# Patient Record
Sex: FEMALE | URBAN - NONMETROPOLITAN AREA
[De-identification: names, ages, dates, MRNs, and addresses within clinical notes are randomized per-mention and may not be internally consistent; named-entity substitution may affect disease eponyms.]

---

## 2023-08-20 ENCOUNTER — HOSPITAL ENCOUNTER (EMERGENCY)
Facility: HOSPITAL | Age: 23
Discharge: LEFT BEFORE DISCHARGE | End: 2023-08-20

## 2023-08-20 VITALS
HEART RATE: 105 BPM | RESPIRATION RATE: 18 BRPM | SYSTOLIC BLOOD PRESSURE: 136 MMHG | BODY MASS INDEX: 29.99 KG/M2 | HEIGHT: 65 IN | TEMPERATURE: 97.8 F | DIASTOLIC BLOOD PRESSURE: 87 MMHG | OXYGEN SATURATION: 100 % | WEIGHT: 180 LBS

## 2023-08-20 PROCEDURE — 99211 OFF/OP EST MAY X REQ PHY/QHP: CPT

## 2024-02-09 ENCOUNTER — HOSPITAL ENCOUNTER (OUTPATIENT)
Dept: GENERAL RADIOLOGY | Facility: HOSPITAL | Age: 24
Discharge: HOME | End: 2024-02-09
Admitting: NURSE PRACTITIONER
Payer: COMMERCIAL

## 2024-02-09 ENCOUNTER — TRANSCRIBE ORDERS (OUTPATIENT)
Dept: ADMINISTRATIVE | Facility: HOSPITAL | Age: 24
End: 2024-02-09
Payer: COMMERCIAL

## 2024-02-09 DIAGNOSIS — M79.672 PAIN IN LEFT FOOT: Primary | ICD-10-CM

## 2024-02-09 DIAGNOSIS — M79.672 PAIN IN LEFT FOOT: ICD-10-CM

## 2024-02-09 PROCEDURE — 73630 X-RAY EXAM OF FOOT: CPT

## 2024-02-22 ENCOUNTER — OFFICE VISIT (OUTPATIENT)
Dept: INTERNAL MEDICINE | Facility: CLINIC | Age: 24
End: 2024-02-22
Payer: COMMERCIAL

## 2024-02-22 VITALS
TEMPERATURE: 97.9 F | HEIGHT: 65 IN | WEIGHT: 191 LBS | OXYGEN SATURATION: 98 % | BODY MASS INDEX: 31.82 KG/M2 | DIASTOLIC BLOOD PRESSURE: 68 MMHG | SYSTOLIC BLOOD PRESSURE: 106 MMHG | HEART RATE: 79 BPM

## 2024-02-22 DIAGNOSIS — F41.9 ANXIETY AND DEPRESSION: ICD-10-CM

## 2024-02-22 DIAGNOSIS — N92.6 IRREGULAR MENSES: ICD-10-CM

## 2024-02-22 DIAGNOSIS — F32.A ANXIETY AND DEPRESSION: ICD-10-CM

## 2024-02-22 DIAGNOSIS — F19.11 HISTORY OF SUBSTANCE ABUSE: Primary | ICD-10-CM

## 2024-02-22 PROCEDURE — 99203 OFFICE O/P NEW LOW 30 MIN: CPT | Performed by: NURSE PRACTITIONER

## 2024-02-22 RX ORDER — HYDROXYZINE HYDROCHLORIDE 10 MG/1
10 TABLET, FILM COATED ORAL 3 TIMES DAILY PRN
COMMUNITY

## 2024-02-22 RX ORDER — FLUOXETINE 10 MG/1
10 CAPSULE ORAL DAILY
COMMUNITY

## 2024-02-22 RX ORDER — HYDROXYZINE HYDROCHLORIDE 25 MG/1
25 TABLET, FILM COATED ORAL DAILY
COMMUNITY

## 2024-02-22 NOTE — PROGRESS NOTES
Subjective     Chief Complaint:  Anxiety and depression    HPI:  Patient presents to the office today for a physical exam as part of the intake process for [facility].  She has a history of methamphetamine abuse and has been to rehab for this in the past as well.  She has a medical history significant for anxiety and depression.  She has been seeing a therapist/psychiatrist at the facility and was placed on Prozac and hydroxyzine yesterday, which she has not started yet.  Please see assessment and plan below.    Patient's PMR from outside medical facility reviewed and noted.    Past Medical History:   Past Medical History:   Diagnosis Date    ADD (attention deficit disorder)     Anxiety     Depression      Past Surgical History:  Past Surgical History:   Procedure Laterality Date    TONSILLECTOMY AND ADENOIDECTOMY       Social History:  reports that she has been smoking cigarettes. She started smoking about 7 years ago. She has been smoking an average of .5 packs per day. She does not have any smokeless tobacco history on file. She reports that she does not currently use alcohol. She reports that she does not currently use drugs after having used the following drugs: Methamphetamines.    Family History: family history is not on file.      Allergies:  Allergies   Allergen Reactions    Menthol (Topical Analgesic) Swelling     Medications:  Prior to Admission medications    Medication Sig Start Date End Date Taking? Authorizing Provider   FLUoxetine (PROzac) 10 MG capsule Take 1 capsule by mouth Daily.    Dionna Kapadia MD   hydrOXYzine (ATARAX) 10 MG tablet Take 1 tablet by mouth 3 (Three) Times a Day As Needed for Itching.    Dionna Kapadia MD   hydrOXYzine (ATARAX) 25 MG tablet Take 1 tablet by mouth Daily.    Dionna Kapadia MD       Objective     Vital Signs: /68 (BP Location: Left arm, Patient Position: Sitting, Cuff Size: Adult)   Pulse 79   Temp  "97.9 °F (36.6 °C) (Temporal)   Ht 165.1 cm (65\")   Wt 86.6 kg (191 lb)   LMP 02/07/2024   SpO2 98%   BMI 31.78 kg/m²   Physical Exam  Vitals and nursing note reviewed.   Constitutional:       General: She is not in acute distress.     Appearance: She is obese. She is not ill-appearing or toxic-appearing.   HENT:      Head: Normocephalic and atraumatic.      Mouth/Throat:      Mouth: Mucous membranes are moist.      Pharynx: Oropharynx is clear.   Cardiovascular:      Rate and Rhythm: Normal rate and regular rhythm.      Pulses: Normal pulses.      Heart sounds: Normal heart sounds.   Pulmonary:      Effort: Pulmonary effort is normal.      Breath sounds: No wheezing, rhonchi or rales.   Abdominal:      General: Bowel sounds are normal. There is no distension.      Palpations: Abdomen is soft.      Tenderness: There is no abdominal tenderness.   Musculoskeletal:         General: No swelling or tenderness. Normal range of motion.      Cervical back: Normal range of motion and neck supple. No tenderness.   Skin:     General: Skin is warm and dry.      Findings: No erythema or rash.   Neurological:      General: No focal deficit present.      Mental Status: She is alert and oriented to person, place, and time.   Psychiatric:         Mood and Affect: Mood normal.         Behavior: Behavior normal.         Thought Content: Thought content normal.         Judgment: Judgment normal.       Assessment / Plan     Assessment/Plan:  Diagnoses and all orders for this visit:    1. History of substance abuse (Primary)    2. Anxiety and depression    3. Irregular menses       Patient presents to the office today for a physical exam as part of the intake process for [facility].  She has a history of methamphetamine abuse and has been to rehab for this in the past as well.  She has a medical history significant for anxiety and depression.  She has been seeing a therapist/psychiatrist at the facility " and was placed on Prozac and hydroxyzine yesterday, which she has not started yet.  She does report being on antidepressants in the past and stopped these of her own accord as she did not like the way the medication made her feel.  She is more open to being on medication for these problems at this time.  She feels she is affected equally by both anxiety and depression.  She denies any suicidal or homicidal ideation.    Patient does report some irregular periods.  She states that when she is sober her periods are typically regular and seem to be normal.  Since October, she has not had a normal period, has just seemed to have some spotting each month.  She did have a negative pregnancy test on Monday per her report.  She has never had a Pap smear or gynecological evaluation.  She had an appointment with gynecology last week and did not go to this.  Discussed the importance of rescheduling this as soon as possible.    Patient denies any other concerns or complaints today.  Patient was encouraged to return to the office to establish care when she is released from the facility.  If she does, we will need to keep in mind that she does have a phobia of needles with past IV drug use.    Return if symptoms worsen or fail to improve. unless patient needs to be seen sooner or acute issues arise.    I have discussed the patient results/orders and and plan/recommendation with them at today's visit.      hSakila Adams, APRN   02/22/2024